# Patient Record
Sex: MALE | Race: BLACK OR AFRICAN AMERICAN | Employment: OTHER | URBAN - METROPOLITAN AREA
[De-identification: names, ages, dates, MRNs, and addresses within clinical notes are randomized per-mention and may not be internally consistent; named-entity substitution may affect disease eponyms.]

---

## 2024-03-09 ENCOUNTER — HOSPITAL ENCOUNTER (EMERGENCY)
Facility: HOSPITAL | Age: 34
Discharge: HOME | End: 2024-03-09
Payer: COMMERCIAL

## 2024-03-09 ENCOUNTER — APPOINTMENT (OUTPATIENT)
Dept: GENERAL RADIOLOGY | Facility: HOSPITAL | Age: 34
End: 2024-03-09
Payer: COMMERCIAL

## 2024-03-09 VITALS
DIASTOLIC BLOOD PRESSURE: 87 MMHG | RESPIRATION RATE: 14 BRPM | SYSTOLIC BLOOD PRESSURE: 141 MMHG | WEIGHT: 203 LBS | HEIGHT: 70 IN | HEART RATE: 73 BPM | OXYGEN SATURATION: 95 % | BODY MASS INDEX: 29.06 KG/M2 | TEMPERATURE: 98 F

## 2024-03-09 DIAGNOSIS — R07.89 CHEST PAIN, NON-CARDIAC: Primary | ICD-10-CM

## 2024-03-09 DIAGNOSIS — F41.9 ANXIETY: ICD-10-CM

## 2024-03-09 LAB
ALBUMIN SERPL-MCNC: 4.3 G/DL (ref 3.2–4.8)
ALBUMIN/GLOB SERPL: 1.8 {RATIO} (ref 1–2)
ALP LIVER SERPL-CCNC: 47 U/L
ALT SERPL-CCNC: 18 U/L
ANION GAP SERPL CALC-SCNC: 4 MMOL/L (ref 0–18)
AST SERPL-CCNC: 24 U/L (ref ?–34)
BILIRUB SERPL-MCNC: 0.5 MG/DL (ref 0.3–1.2)
BUN BLD-MCNC: 9 MG/DL (ref 9–23)
BUN/CREAT SERPL: 9.2 (ref 10–20)
CALCIUM BLD-MCNC: 9.4 MG/DL (ref 8.7–10.4)
CHLORIDE SERPL-SCNC: 110 MMOL/L (ref 98–112)
CO2 SERPL-SCNC: 27 MMOL/L (ref 21–32)
CREAT BLD-MCNC: 0.98 MG/DL
EGFRCR SERPLBLD CKD-EPI 2021: 104 ML/MIN/1.73M2 (ref 60–?)
GLOBULIN PLAS-MCNC: 2.4 G/DL (ref 2.8–4.4)
GLUCOSE BLD-MCNC: 98 MG/DL (ref 70–99)
MAGNESIUM SERPL-MCNC: 2 MG/DL (ref 1.6–2.6)
OSMOLALITY SERPL CALC.SUM OF ELEC: 291 MOSM/KG (ref 275–295)
POTASSIUM SERPL-SCNC: 4.3 MMOL/L (ref 3.5–5.1)
PROT SERPL-MCNC: 6.7 G/DL (ref 5.7–8.2)
SODIUM SERPL-SCNC: 141 MMOL/L (ref 136–145)
TROPONIN I SERPL HS-MCNC: 3 NG/L

## 2024-03-09 PROCEDURE — 36415 COLL VENOUS BLD VENIPUNCTURE: CPT

## 2024-03-09 PROCEDURE — 83735 ASSAY OF MAGNESIUM: CPT

## 2024-03-09 PROCEDURE — 99284 EMERGENCY DEPT VISIT MOD MDM: CPT

## 2024-03-09 PROCEDURE — 85025 COMPLETE CBC W/AUTO DIFF WBC: CPT

## 2024-03-09 PROCEDURE — 84484 ASSAY OF TROPONIN QUANT: CPT

## 2024-03-09 PROCEDURE — 71045 X-RAY EXAM CHEST 1 VIEW: CPT

## 2024-03-09 PROCEDURE — 93010 ELECTROCARDIOGRAM REPORT: CPT

## 2024-03-09 PROCEDURE — 93005 ELECTROCARDIOGRAM TRACING: CPT

## 2024-03-09 PROCEDURE — 80053 COMPREHEN METABOLIC PANEL: CPT

## 2024-03-09 PROCEDURE — 85060 BLOOD SMEAR INTERPRETATION: CPT

## 2024-03-09 NOTE — ED INITIAL ASSESSMENT (HPI)
Chest pain x 1 week. Right and left side and tingling from thigh to knee bilaterally. Patient states that pain in the chest is stabbing and tingling. Intermittent. Pt states that worry and stress bring on the pain. Patient states there are various radiation sites, bilateral arms, shoulders, and lower back.

## 2024-03-09 NOTE — ED PROVIDER NOTES
Patient Seen in: [facility] Emergency Department      History     Chief Complaint   Patient presents with    Chest Pain     Stated Complaint: Chest Pain    Subjective:   HPI    33-year-old male presents today with complaints of chest pain that it was exacerbated by his dad being sick in the hospital.  Patient states he just found out that his dad is being discharged from the hospital is stating to feel a lot better.  Patient states that when he does get this chest pain it is intermittent and radiates from left to right and feels sharp.  Patient denies any recent travel.  Patient states that he does smoke cigarettes.  Patient denies any family history of cardiac events.    Objective:   No pertinent past medical history.            No pertinent past surgical history.              No pertinent social history.            Review of Systems   Constitutional: Negative.    HENT: Negative.     Eyes: Negative.    Respiratory: Negative.     Cardiovascular:  Positive for chest pain.   Gastrointestinal: Negative.    Endocrine: Negative.    Genitourinary: Negative.    Musculoskeletal: Negative.    Skin: Negative.    Allergic/Immunologic: Negative.    Neurological: Negative.    Hematological: Negative.    Psychiatric/Behavioral: Negative.         Positive for stated complaint: Chest Pain  Other systems are as noted in HPI.  Constitutional and vital signs reviewed.      All other systems reviewed and negative except as noted above.    Physical Exam     ED Triage Vitals   BP 03/09/24 1545 130/88   Pulse 03/09/24 1545 91   Resp 03/09/24 1543 20   Temp 03/09/24 1543 98.3 °F (36.8 °C)   Temp src --    SpO2 03/09/24 1545 97 %   O2 Device 03/09/24 1545 None (Room air)       Current:/87   Pulse 73   Temp 98.3 °F (36.8 °C)   Resp 14   Ht 177.8 cm (5' 10\")   Wt 92.1 kg   SpO2 95%   BMI 29.13 kg/m²         Physical Exam  Vitals and nursing note reviewed.   Constitutional:       Appearance: He is well-developed and  normal weight.   HENT:      Head: Normocephalic.   Eyes:      Extraocular Movements: Extraocular movements intact.      Pupils: Pupils are equal, round, and reactive to light.   Cardiovascular:      Rate and Rhythm: Normal rate and regular rhythm.      Pulses:           Radial pulses are 2+ on the right side and 2+ on the left side.      Heart sounds: Normal heart sounds.   Pulmonary:      Effort: Pulmonary effort is normal.      Breath sounds: Normal breath sounds.   Musculoskeletal:      Cervical back: Normal range of motion and neck supple.   Skin:     Capillary Refill: Capillary refill takes less than 2 seconds.   Neurological:      General: No focal deficit present.      Mental Status: He is alert.   Psychiatric:         Mood and Affect: Mood normal.             ED Course     Labs Reviewed   COMP METABOLIC PANEL (14) - Abnormal; Notable for the following components:       Result Value    BUN/CREA Ratio 9.2 (*)     Globulin  2.4 (*)     All other components within normal limits   CBC W/ DIFFERENTIAL - Abnormal; Notable for the following components:    HCT 36.7 (*)     MCV 65.0 (*)     MCH 23.2 (*)     RDW-SD 34.7 (*)     RDW 16.1 (*)     All other components within normal limits   TROPONIN I HIGH SENSITIVITY - Normal   MAGNESIUM - Normal   CBC WITH DIFFERENTIAL WITH PLATELET    Narrative:     The following orders were created for panel order CBC With Differential With Platelet.  Procedure                               Abnormality         Status                     ---------                               -----------         ------                     CBC W/ DIFFERENTIAL[[id number]]          Abnormal            Preliminary result           Please view results for these tests on the individual orders.   MD BLOOD SMEAR CONSULT   RAINBOW DRAW LAVENDER   RAINBOW DRAW LIGHT GREEN   RAINBOW DRAW BLUE     EKG    Rate, intervals and axes as noted on EKG Report.  Rate: 86  Rhythm: Sinus Rhythm  Reading: NSR                         MDM      33-year-old male presents today with complaints of chest pain that it was exacerbated by his dad being sick in the hospital.  Patient states he just found out that his dad is being discharged from the hospital is stating to feel a lot better.  Patient states that when he does get this chest pain it is intermittent and radiates from left to right and feels sharp.  Patient denies any recent travel.  Patient states that he does smoke cigarettes.  Patient denies any family history of cardiac events.  Vital signs: Please see EMR.  Physical exam: Please see exam.  Differential diagnosis: Anxiety, cardiac event, metabolic disorder, anemia.  EKG: NSR  Heart Score:    HEART Score      Title      Criteria Score   Age: <45 Age Score: 0   History: Slightly Suspicious Hx Score: 0     EKG: Normal EKG Score: 0   HTN: No   Hypercholesterolemia: No   Atherosclerosis/PVD: No     DM: No   BMI>30kg/m2: No   Smoking: Yes   Family History: No         Other Risk Factor Score: 1             Lab Results   Component Value Date    TROPHS 3 03/09/2024           HEART Score: 1        Risk of adverse cardiac event is 0.9-1.7%        XR CHEST AP PORTABLE  (CPT=71045)    Result Date: 3/9/2024  CONCLUSION:  No acute cardiopulmonary process.    Dictated by (CST): Caleb Maya MD on 3/09/2024 at 4:42 PM     Finalized by (CST): Caleb Maya MD on 3/09/2024 at 4:43 PM         Recent Results (from the past 24 hour(s))   EKG 12 Lead    Collection Time: 03/09/24  3:41 PM   Result Value Ref Range    Ventricular rate 86 BPM    Atrial rate 86 BPM    P-R Interval 128 ms    QRS Duration 82 ms    Q-T Interval 344 ms    QTC Calculation (Bezet) 411 ms    P Axis 60 degrees    R Axis 61 degrees    T Axis -13 degrees   Comp Metabolic Panel (14)    Collection Time: 03/09/24  4:42 PM   Result Value Ref Range    Glucose 98 70 - 99 mg/dL    Sodium 141 136 - 145 mmol/L    Potassium 4.3 3.5 - 5.1 mmol/L    Chloride 110 98 - 112 mmol/L    CO2  27.0 21.0 - 32.0 mmol/L    Anion Gap 4 0 - 18 mmol/L    BUN 9 9 - 23 mg/dL    Creatinine 0.98 0.70 - 1.30 mg/dL    BUN/CREA Ratio 9.2 (L) 10.0 - 20.0    Calcium, Total 9.4 8.7 - 10.4 mg/dL    Calculated Osmolality 291 275 - 295 mOsm/kg    eGFR-Cr 104 >=60 mL/min/1.73m2    ALT 18 10 - 49 U/L    AST 24 <=34 U/L    Alkaline Phosphatase 47 45 - 117 U/L    Bilirubin, Total 0.5 0.3 - 1.2 mg/dL    Total Protein 6.7 5.7 - 8.2 g/dL    Albumin 4.3 3.2 - 4.8 g/dL    Globulin  2.4 (L) 2.8 - 4.4 g/dL    A/G Ratio 1.8 1.0 - 2.0   Troponin I (High Sensitivity)    Collection Time: 03/09/24  4:42 PM   Result Value Ref Range    Troponin I (High Sensitivity) 3 <=53 ng/L   Magnesium    Collection Time: 03/09/24  4:42 PM   Result Value Ref Range    Magnesium 2.0 1.6 - 2.6 mg/dL   CBC W/ DIFFERENTIAL    Collection Time: 03/09/24  4:42 PM   Result Value Ref Range    WBC 7.5 4.0 - 11.0 x10(3) uL    RBC 5.65 4.30 - 5.70 x10(6)uL    HGB 13.1 13.0 - 17.5 g/dL    HCT 36.7 (L) 39.0 - 53.0 %    MCV 65.0 (L) 80.0 - 100.0 fL    MCH 23.2 (L) 26.0 - 34.0 pg    MCHC 35.7 31.0 - 37.0 g/dL    RDW-SD 34.7 (L) 35.1 - 46.3 fL    RDW 16.1 (H) 11.0 - 15.0 %    .0 150.0 - 450.0 10(3)uL    Neutrophil Absolute Prelim 4.37 1.50 - 7.70 x10 (3) uL     Will diagnosed with noncardiac chest pain and anxiety.  Patient is to follow-up with primary care provider for management of physical manifestations of anxiety.  ED precautions given.  Note to Patient  The 21st Century Cures Act makes medical notes like these available to patients in the interest of transparency. However, be advised this is a medical document and is intended as [id number] communication; it is written in medical language and may appear blunt, direct, or contain abbreviations or verbiage that are unfamiliar. Medical documents are intended to carry relevant information, facts as evident, and the clinical opinion of the practitioner.     This report has been produced using speech recognition  software, and may contain errors related to grammar, punctuation, spelling, words or phrases unrecognized or not translated appropriately to text; these errors may be referred to the dictating provider for further clarification and/or addendum as needed.                                       Medical Decision Making  33-year-old male presents today with complaints of chest pain that it was exacerbated by his dad being sick in the hospital.  Patient states he just found out that his dad is being discharged from the hospital is stating to feel a lot better.  Patient states that when he does get this chest pain it is intermittent and radiates from left to right and feels sharp.  Patient denies any recent travel.  Patient states that he does smoke cigarettes.  Patient denies any family history of cardiac events.    Problems Addressed:  Anxiety: acute illness or injury  Chest pain, non-cardiac: acute illness or injury    Amount and/or Complexity of Data Reviewed  Labs: ordered. Decision-making details documented in ED Course.  Radiology: ordered. Decision-making details documented in ED Course.  ECG/medicine tests: ordered. Decision-making details documented in ED Course.        Disposition and Plan     Clinical Impression:  1. Chest pain, non-cardiac    2. Anxiety         Disposition:  Discharge  3/9/2024  5:28 pm    Follow-up:  Gerardo Butts MD  [address] [place] [id number]  [phone]    Follow up in 1 week(s)  ER follow up          Medications Prescribed:  There are no discharge medications for this patient.

## 2024-03-10 LAB
ATRIAL RATE: 86 BPM
P AXIS: 60 DEGREES
P-R INTERVAL: 128 MS
Q-T INTERVAL: 344 MS
QRS DURATION: 82 MS
QTC CALCULATION (BEZET): 411 MS
R AXIS: 61 DEGREES
T AXIS: -13 DEGREES
VENTRICULAR RATE: 86 BPM

## 2024-03-11 LAB
BASOPHILS # BLD AUTO: 0.05 X10(3) UL (ref 0–0.2)
BASOPHILS NFR BLD AUTO: 0.7 %
DEPRECATED RDW RBC AUTO: 34.7 FL (ref 35.1–46.3)
EOSINOPHIL # BLD AUTO: 0.07 X10(3) UL (ref 0–0.7)
EOSINOPHIL NFR BLD AUTO: 0.9 %
ERYTHROCYTE [DISTWIDTH] IN BLOOD BY AUTOMATED COUNT: 16.1 % (ref 11–15)
HCT VFR BLD AUTO: 36.7 %
HGB BLD-MCNC: 13.1 G/DL
IMM GRANULOCYTES # BLD AUTO: 0.02 X10(3) UL (ref 0–1)
IMM GRANULOCYTES NFR BLD: 0.3 %
LYMPHOCYTES # BLD AUTO: 2.38 X10(3) UL (ref 1–4)
LYMPHOCYTES NFR BLD AUTO: 31.8 %
MCH RBC QN AUTO: 23.2 PG (ref 26–34)
MCHC RBC AUTO-ENTMCNC: 35.7 G/DL (ref 31–37)
MCV RBC AUTO: 65 FL
MONOCYTES # BLD AUTO: 0.59 X10(3) UL (ref 0.1–1)
MONOCYTES NFR BLD AUTO: 7.9 %
NEUTROPHILS # BLD AUTO: 4.37 X10 (3) UL (ref 1.5–7.7)
NEUTROPHILS # BLD AUTO: 4.37 X10(3) UL (ref 1.5–7.7)
NEUTROPHILS NFR BLD AUTO: 58.4 %
PLATELET # BLD AUTO: 228 10(3)UL (ref 150–450)
RBC # BLD AUTO: 5.65 X10(6)UL
WBC # BLD AUTO: 7.5 X10(3) UL (ref 4–11)